# Patient Record
Sex: MALE | Race: WHITE
[De-identification: names, ages, dates, MRNs, and addresses within clinical notes are randomized per-mention and may not be internally consistent; named-entity substitution may affect disease eponyms.]

---

## 2018-10-07 ENCOUNTER — HOSPITAL ENCOUNTER (EMERGENCY)
Dept: HOSPITAL 72 - EMR | Age: 28
Discharge: HOME | End: 2018-10-07
Payer: MEDICAID

## 2018-10-07 VITALS — DIASTOLIC BLOOD PRESSURE: 69 MMHG | SYSTOLIC BLOOD PRESSURE: 105 MMHG

## 2018-10-07 VITALS — SYSTOLIC BLOOD PRESSURE: 111 MMHG | DIASTOLIC BLOOD PRESSURE: 77 MMHG

## 2018-10-07 VITALS — SYSTOLIC BLOOD PRESSURE: 113 MMHG | DIASTOLIC BLOOD PRESSURE: 72 MMHG

## 2018-10-07 VITALS — SYSTOLIC BLOOD PRESSURE: 119 MMHG | DIASTOLIC BLOOD PRESSURE: 70 MMHG

## 2018-10-07 VITALS — SYSTOLIC BLOOD PRESSURE: 136 MMHG | DIASTOLIC BLOOD PRESSURE: 78 MMHG

## 2018-10-07 VITALS — WEIGHT: 150 LBS | HEIGHT: 68 IN | BODY MASS INDEX: 22.73 KG/M2

## 2018-10-07 VITALS — DIASTOLIC BLOOD PRESSURE: 72 MMHG | SYSTOLIC BLOOD PRESSURE: 113 MMHG

## 2018-10-07 VITALS — SYSTOLIC BLOOD PRESSURE: 122 MMHG | DIASTOLIC BLOOD PRESSURE: 76 MMHG

## 2018-10-07 VITALS — SYSTOLIC BLOOD PRESSURE: 102 MMHG | DIASTOLIC BLOOD PRESSURE: 59 MMHG

## 2018-10-07 VITALS — DIASTOLIC BLOOD PRESSURE: 62 MMHG | SYSTOLIC BLOOD PRESSURE: 107 MMHG

## 2018-10-07 DIAGNOSIS — F19.10: Primary | ICD-10-CM

## 2018-10-07 DIAGNOSIS — F14.10: ICD-10-CM

## 2018-10-07 DIAGNOSIS — F13.10: ICD-10-CM

## 2018-10-07 DIAGNOSIS — Z78.1: ICD-10-CM

## 2018-10-07 LAB
ADD MANUAL DIFF: NO
ALBUMIN SERPL-MCNC: 3.6 G/DL (ref 3.4–5)
ALBUMIN/GLOB SERPL: 1.2 {RATIO} (ref 1–2.7)
ALP SERPL-CCNC: 90 U/L (ref 46–116)
ALT SERPL-CCNC: 28 U/L (ref 12–78)
ANION GAP SERPL CALC-SCNC: 9 MMOL/L (ref 5–15)
AST SERPL-CCNC: 25 U/L (ref 15–37)
BASOPHILS NFR BLD AUTO: 0.6 % (ref 0–2)
BILIRUB SERPL-MCNC: 0.3 MG/DL (ref 0.2–1)
BUN SERPL-MCNC: 7 MG/DL (ref 7–18)
CALCIUM SERPL-MCNC: 7.5 MG/DL (ref 8.5–10.1)
CHLORIDE SERPL-SCNC: 111 MMOL/L (ref 98–107)
CO2 SERPL-SCNC: 25 MMOL/L (ref 21–32)
CREAT SERPL-MCNC: 0.9 MG/DL (ref 0.55–1.3)
EOSINOPHIL NFR BLD AUTO: 0.1 % (ref 0–3)
ERYTHROCYTE [DISTWIDTH] IN BLOOD BY AUTOMATED COUNT: 11.2 % (ref 11.6–14.8)
GLOBULIN SER-MCNC: 3 G/DL
HCT VFR BLD CALC: 38.1 % (ref 42–52)
HGB BLD-MCNC: 12.6 G/DL (ref 14.2–18)
LYMPHOCYTES NFR BLD AUTO: 19.3 % (ref 20–45)
MCV RBC AUTO: 85 FL (ref 80–99)
MONOCYTES NFR BLD AUTO: 7.9 % (ref 1–10)
NEUTROPHILS NFR BLD AUTO: 72.1 % (ref 45–75)
PLATELET # BLD: 276 K/UL (ref 150–450)
POTASSIUM SERPL-SCNC: 3.3 MMOL/L (ref 3.5–5.1)
RBC # BLD AUTO: 4.5 M/UL (ref 4.7–6.1)
SODIUM SERPL-SCNC: 145 MMOL/L (ref 136–145)
WBC # BLD AUTO: 10.2 K/UL (ref 4.8–10.8)

## 2018-10-07 PROCEDURE — 99291 CRITICAL CARE FIRST HOUR: CPT

## 2018-10-07 PROCEDURE — 80053 COMPREHEN METABOLIC PANEL: CPT

## 2018-10-07 PROCEDURE — 70450 CT HEAD/BRAIN W/O DYE: CPT

## 2018-10-07 PROCEDURE — 96374 THER/PROPH/DIAG INJ IV PUSH: CPT

## 2018-10-07 PROCEDURE — 85025 COMPLETE CBC W/AUTO DIFF WBC: CPT

## 2018-10-07 PROCEDURE — 96361 HYDRATE IV INFUSION ADD-ON: CPT

## 2018-10-07 PROCEDURE — 96375 TX/PRO/DX INJ NEW DRUG ADDON: CPT

## 2018-10-07 PROCEDURE — 80307 DRUG TEST PRSMV CHEM ANLYZR: CPT

## 2018-10-07 PROCEDURE — 36415 COLL VENOUS BLD VENIPUNCTURE: CPT

## 2018-10-07 PROCEDURE — 80329 ANALGESICS NON-OPIOID 1 OR 2: CPT

## 2018-10-07 NOTE — EMERGENCY ROOM REPORT
History of Present Illness


General


Chief Complaint:  Alcohol Intoxication


Source:  EMS





Present Illness


HPI


Mr. Nair is a healthy 29 yo male without significant past medical hx who 

presents with abnormal behavior.  Brought by EMS.  He lives with family of 

girlfriend.  Girlfriend's father called 911 for violent agitation.  EMS 

discovered patient punching the bed.  He did drink copious amounts of alcohol 

last night.  Concern for possible drug use.  No hx of psychiatric hx.  Both 

parents have hx of mental illness.  Patient required IV versed for sedation by 

EMS.  Currnently patient speaks unintelligibly.


Allergies:  


Coded Allergies:  


     No Known Allergies (Unverified , 10/7/18)





Patient History


Limited by:  medical condition


Social History:  Reports: alcohol use


Reviewed Nursing Documentation:  PMH: Agreed; PSxH: Agreed





Nursing Documentation-PMH


Past Medical History:  No Stated History





Review of Systems


All Other Systems:  limited - ams





Physical Exam





Vital Signs








  Date Time  Temp Pulse Resp B/P (MAP) Pulse Ox O2 Delivery O2 Flow Rate FiO2


 


10/7/18 07:38 98.5 86 20 136/78 98 Room Air  





 98.4       








Sp02 EP Interpretation:  reviewed, normal


General Appearance:  other - agitated, handcuffed to EMS stretcher, swinging at 

staff, wailing, yelling unintellible statement


Eyes:  bilateral eye normal inspection, bilateral eye PERRL


ENT:  normal voice, moist mucus membranes


Neck:  normal inspection, full range of motion, supple


Respiratory:  normal inspection, chest non-tender, lungs clear, normal breath 

sounds, no rhonchi, no respiratory distress, no retraction, no accessory muscle 

use


Cardiovascular #1:  normal inspection, normal peripheral pulses, regular rate, 

rhythm, no edema, no gallop, no murmur, no rub


Gastrointestinal:  normal inspection, normal bowel sounds, non tender, soft, no 

mass, no organomegaly, no peritonitis


Neurologic:  other - awake, makes eye contact, does not cooperative with 

questions


Skin:  normal color, warm/dry





Procedures


Critical Care Time


Critical Care Time


35 minutes of critical care time excluding procedures were used in the care of 

the patient.  I reviewed labs.  Patient required multiple reassessments.





Medical Decision Making


Restraint Reassesment


I supervised the placement of restraints.  Clinically indicated for violent 

behavior.


Diagnostic Impression:  


 Primary Impression:  


 Altered mental status


 Additional Impression:  


 Polysubstance abuse


ER Course


Mr. Nair presents via EMS requiring IV sedation.  He required chemical and 

physical restraint upon arrival for violent behavior.  I suspect drug-induced 

encephalopathy.  He appears intoxicated. 





At 0840,  Girlfriend arrived to ED.  She provided additional information.  

Patient apparently continued to drink alcohol after arriving home with 

girlfriend.   He did use powder cocaine last night.  Her sister heard him fall 

in the bathroom.  He began wailing and talking to himself.   with history of 

trauma, CT head was ordered 





After six hour of observation, patient was awake and alert.  He does not 

remember the episode which brought him to ER.  He is calm and cooperative.  Dc'

d home to care of girlfriend who is bedside.





Labs








Test


  10/7/18


08:00 10/7/18


09:45


 


Urine Opiates Screen


  Negative


(NEGATIVE) 


 


 


Urine Barbiturates Screen


  Negative


(NEGATIVE) 


 


 


Phencyclidine (PCP) Screen


  Negative


(NEGATIVE) 


 


 


Urine Amphetamines Screen


  Negative


(NEGATIVE) 


 


 


Urine Benzodiazepines Screen


  Positive


(NEGATIVE) 


 


 


Urine Cocaine Screen


  Positive


(NEGATIVE) 


 


 


Urine Marijuana (THC) Screen


  Negative


(NEGATIVE) 


 


 


White Blood Count


  


  10.2 K/UL


(4.8-10.8)


 


Red Blood Count


  


  4.50 M/UL


(4.70-6.10)


 


Hemoglobin


  


  12.6 G/DL


(14.2-18.0)


 


Hematocrit


  


  38.1 %


(42.0-52.0)


 


Mean Corpuscular Volume  85 FL (80-99) 


 


Mean Corpuscular Hemoglobin


  


  28.0 PG


(27.0-31.0)


 


Mean Corpuscular Hemoglobin


Concent 


  33.0 G/DL


(32.0-36.0)


 


Red Cell Distribution Width


  


  11.2 %


(11.6-14.8)


 


Platelet Count


  


  276 K/UL


(150-450)


 


Mean Platelet Volume


  


  6.3 FL


(6.5-10.1)


 


Neutrophils (%) (Auto)


  


  72.1 %


(45.0-75.0)


 


Lymphocytes (%) (Auto)


  


  19.3 %


(20.0-45.0)


 


Monocytes (%) (Auto)


  


  7.9 %


(1.0-10.0)


 


Eosinophils (%) (Auto)


  


  0.1 %


(0.0-3.0)


 


Basophils (%) (Auto)


  


  0.6 %


(0.0-2.0)


 


Sodium Level


  


  145 MMOL/L


(136-145)


 


Potassium Level


  


  3.3 MMOL/L


(3.5-5.1)


 


Chloride Level


  


  111 MMOL/L


()


 


Carbon Dioxide Level


  


  25 MMOL/L


(21-32)


 


Anion Gap


  


  9 mmol/L


(5-15)


 


Blood Urea Nitrogen  7 mg/dL (7-18) 


 


Creatinine


  


  0.9 MG/DL


(0.55-1.30)


 


Estimat Glomerular Filtration


Rate 


  > 60 mL/min


(>60)


 


Glucose Level


  


  112 MG/DL


()


 


Calcium Level


  


  7.5 MG/DL


(8.5-10.1)


 


Total Bilirubin


  


  0.3 MG/DL


(0.2-1.0)


 


Aspartate Amino Transf


(AST/SGOT) 


  25 U/L (15-37) 


 


 


Alanine Aminotransferase


(ALT/SGPT) 


  28 U/L (12-78) 


 


 


Alkaline Phosphatase


  


  90 U/L


()


 


Total Protein


  


  6.6 G/DL


(6.4-8.2)


 


Albumin


  


  3.6 G/DL


(3.4-5.0)


 


Globulin  3.0 g/dL 


 


Albumin/Globulin Ratio  1.2 (1.0-2.7) 


 


Salicylates Level


  


  0.8 ug/mL


(2.8-20)


 


Acetaminophen Level


  


  < 2 MCG/ML


(10-30)


 


Serum Alcohol  220 mg/dL 








CT/MRI/US Diagnostic Results


CT/MRI/US Diagnostic Results :  


   Impression


CT head ordered NAP This image has been electronically signed by Dr. Nara Cho


Reevaluation Time:  08:22





Last Vital Signs








  Date Time  Temp Pulse Resp B/P (MAP) Pulse Ox O2 Delivery O2 Flow Rate FiO2


 


10/7/18 08:07 98.4  20 136/78 98 Room Air  





 98.4       


 


10/7/18 07:38  86      








Reevaluation Impression


Patient is in 4 point restraints.  He is protecting airway.  Sedated. Sleeping


Disposition:  HOME, SELF-CARE











Nara Cho MD Oct 7, 2018 08:25

## 2018-10-07 NOTE — DIAGNOSTIC IMAGING REPORT
INDICATION: Acute loss of consciousness

 

TECHNIQUE:  Multiple, contiguous 2.5 mm axial cuts of the brain are 

obtained from the posterior fossa to the cranial vault.  Sagittal and 

coronal reformatted images provided.  No IV contrast is administered. One 

or more of the following dose reduction techniques were used: automated 

exposure control, adjustment of the mA and/or kV according to patient 

size, use of iterative reconstruction technique.

 

COMPARISON: None

 

FINDINGS:  No intracranial hemorrhage, abnormal intra- or extra-axial 

collections or parenchymal lesions are seen.  The shape and configuration 

of the cortical sulci, basal cisterns and ventricles are within normal 

limits.  The grey-white differentiation is preserved.  No evidence of 

mass effect, midline shift, or edema.

 

The osseous structures are unremarkable.   The visualized portions of the 

paranasal sinuses are clear.

 

IMPRESSION:  Normal non-contrast CT scan of the head.

 

CTDI: 70.38 mGy

DLP: 1323.08 mGycm

## 2018-10-08 ENCOUNTER — HOSPITAL ENCOUNTER (EMERGENCY)
Dept: HOSPITAL 72 - EMR | Age: 28
Discharge: HOME | End: 2018-10-08
Payer: MEDICAID

## 2018-10-08 VITALS — HEIGHT: 70 IN | BODY MASS INDEX: 24.05 KG/M2 | WEIGHT: 168 LBS

## 2018-10-08 VITALS — SYSTOLIC BLOOD PRESSURE: 145 MMHG | DIASTOLIC BLOOD PRESSURE: 83 MMHG

## 2018-10-08 VITALS — DIASTOLIC BLOOD PRESSURE: 83 MMHG | SYSTOLIC BLOOD PRESSURE: 145 MMHG

## 2018-10-08 DIAGNOSIS — G24.02: Primary | ICD-10-CM

## 2018-10-08 PROCEDURE — 99284 EMERGENCY DEPT VISIT MOD MDM: CPT

## 2018-10-08 PROCEDURE — 96375 TX/PRO/DX INJ NEW DRUG ADDON: CPT

## 2018-10-08 PROCEDURE — 96374 THER/PROPH/DIAG INJ IV PUSH: CPT

## 2018-10-08 NOTE — EMERGENCY ROOM REPORT
History of Present Illness


General


Chief Complaint:  General Complaint


Source:  Patient





Present Illness


HPI


28-year-old male patient presents ER complaining of locked jaw for the past 

day.  Reports that he woke this morning with jaw symptoms.  Reports as the day 

progressed his jaw became more and more stiff.  Reports unable to move it 

without discomfort.  Denies injury or trauma.  Patient was seen in ER yesterday 

for alcohol intoxication and was given haloperidol, Benadryl, Ativan and then 

discharged home.  reports up to date on vaccinations.  Denies fever, chest pain

, shortness of breath.


Allergies:  


Coded Allergies:  


     No Known Allergies (Unverified , 10/7/18)





Patient History


Past Medical History:  see triage record


Reviewed Nursing Documentation:  PMH: Agreed; PSxH: Agreed





Nursing Documentation-PMH


Past Medical History:  No Stated History





Review of Systems


All Other Systems:  negative except mentioned in HPI





Physical Exam





Vital Signs








  Date Time  Temp Pulse Resp B/P (MAP) Pulse Ox O2 Delivery O2 Flow Rate FiO2


 


10/8/18 17:20 98.7 84 18 145/83 96 Room Air  





 98.8       








Sp02 EP Interpretation:  reviewed, normal


General Appearance:  well appearing, no apparent distress, alert, GCS 15, non-

toxic


Head:  normocephalic, atraumatic


Eyes:  bilateral eye normal inspection, bilateral eye PERRL


ENT:  hearing grossly normal, normal pharynx, no angioedema, normal voice, 

uvula midline, moist mucus membranes, other - decreased range of motion of jaw, 

no jaw clicking, no crepitus, no dislocation


Neck:  full range of motion


Respiratory:  lungs clear, normal breath sounds, no rhonchi, no respiratory 

distress, no accessory muscle use, no wheezing, speaking full sentences


Cardiovascular #1:  regular rate, rhythm, no edema


Genitourinary:  no CVA tenderness


Musculoskeletal:  back normal, digits/nails normal, gait/station normal, normal 

range of motion, non-tender


Neurologic:  alert, oriented x3, responsive, motor strength/tone normal, 

sensory intact


Psychiatric:  mood/affect normal


Skin:  no rash





Medical Decision Making


PA Attestation


Dr. Cho is my supervising Physician whom patient management has been 

discussed with.


Diagnostic Impression:  


 Primary Impression:  


 Dystonic drug reaction


ER Course


Pt. presents to the ED c/o "lockjaw".





Ddx considered but are not limited to Lockjaw, medication side effect, muscle 

spasm, dislocation, subluxation.





Vital signs: are WNL, pt. is afebrile





ER COURSE:


patient seen and evaluated by Dr. Cho. see Dr. Cho note.


 patient provided with Cogentin and Benadryl for symptom relief.


Likely dystonic symptoms due to medications given in the ER yesterday.





patient reports feeling better following providing medication the ER.


does not require imaging at this time, no injury or trauma.


Advised patient to follow-up with primary care provider.


advised patient to take over-the-counter Benadryl as needed for continued 

symptom relief.





DISCHARGE: 





At this time pt is stable for d/c to home.  Patient is resting comfortably, in 

no acute distress, nontoxic appearing, talking without difficulty.


Patient to take medications as instructed


Will provide with patient care instructions and any necessary prescriptions.


Care plan and follow-up instructions provided.  


Patient instructed to follow-up with primary care provider in 3 - 5 days.


Patient questions asked and answered.


Patient reports understanding and agreement to treatment plan.


ER precautions given. Patient instructed to return to ER immediately for any 

new or worsening of symptoms including but not limited to increasing SOB, 

persistent fever, chest pain, intractable vomiting.





- Please note that this Emergency Department Report was dictated using [employer] technology software, occasionally this can lead to 

erroneous entry secondary to interpretation by the dictation equipment.





Last Vital Signs








  Date Time  Temp Pulse Resp B/P (MAP) Pulse Ox O2 Delivery O2 Flow Rate FiO2


 


10/8/18 17:52 98.8 84 18 145/83 96 Room Air  





 98.8       








Status:  improved


Disposition:  HOME, SELF-CARE


Condition:  Stable


Patient Instructions:  Dystonia





Additional Instructions:  


Followup with primary care provider in 3 -5 days.


Take medications as directed. Take Benadryl over-the-counter for relief of 

symptoms.


Patient questions asked and answered.


ER precautions given, patient instructed to return to ER immediately for any 

new or worsening of symptoms.











Ryley Colón Oct 8, 2018 18:11

## 2018-10-08 NOTE — EMERGENCY ROOM REPORT
Physical Exam





Vital Signs








  Date Time  Temp Pulse Resp B/P (MAP) Pulse Ox O2 Delivery O2 Flow Rate FiO2


 


10/8/18 17:20 98.7 84 18 145/83 96 Room Air  





 98.8       











Medical Decision Making


Diagnostic Impression:  


 Primary Impression:  


 Dystonic drug reaction


ER Course


Melvi is a very pleasant gentleman who presents with stiffness of his mouth.  

He woke up today with inability to close his mouth.  He is able to move his 

mouth.  However his  jaw feels stiff. I Had the pleasure of taking care of this 

gentleman on yesterday.  He received antipsychotic haloperidol, diphenhydramine 

and lorazepam for sedation.  For acute agitation due to polysubstance abuse.





He feels otherwise well.





I did not detect TMJ dislocation on exam..  However I  do suspect that he had 

dystonia EPS symptoms due to the medication provided.  Symptoms greatly 

improved with IV Cogentin and diphenhydramine.





I recommended over-the-counter Benadryl if symptoms recur.





Last Vital Signs








  Date Time  Temp Pulse Resp B/P (MAP) Pulse Ox O2 Delivery O2 Flow Rate FiO2


 


10/8/18 17:52 98.8 84 18 145/83 96 Room Air  





 98.8       








Disposition:  HOME, SELF-CARE


Condition:  Stable


Patient Instructions:  Dystonia





Additional Instructions:  


Followup with primary care provider in 3 -5 days.


Take medications as directed. Take Benadryl over-the-counter for relief of 

symptoms.


Patient questions asked and answered.


ER precautions given, patient instructed to return to ER immediately for any 

new or worsening of symptoms.











Nara Cho MD Oct 8, 2018 18:22